# Patient Record
Sex: FEMALE | Race: WHITE | NOT HISPANIC OR LATINO | URBAN - METROPOLITAN AREA
[De-identification: names, ages, dates, MRNs, and addresses within clinical notes are randomized per-mention and may not be internally consistent; named-entity substitution may affect disease eponyms.]

---

## 2023-05-17 ENCOUNTER — OFFICE VISIT (OUTPATIENT)
Facility: LOCATION | Age: 63
End: 2023-05-17
Payer: COMMERCIAL

## 2023-05-17 DIAGNOSIS — H16.223 KERATOCONJUNCTIVITIS SICCA, BILATERAL: ICD-10-CM

## 2023-05-17 DIAGNOSIS — H25.13 AGE-RELATED NUCLEAR CATARACT, BILATERAL: Primary | ICD-10-CM

## 2023-05-17 DIAGNOSIS — H11.442 CONJUNCTIVAL CYST OF LEFT EYE: ICD-10-CM

## 2023-05-17 DIAGNOSIS — H43.822 VITREOMACULAR ADHESION, LEFT EYE: ICD-10-CM

## 2023-05-17 DIAGNOSIS — H52.4 PRESBYOPIA: ICD-10-CM

## 2023-05-17 DIAGNOSIS — D48.1 NEOPLASM OF UNCERTIAN BEHAVIOR OF EYELID: ICD-10-CM

## 2023-05-17 PROCEDURE — 92134 CPTRZ OPH DX IMG PST SGM RTA: CPT

## 2023-05-17 PROCEDURE — 99204 OFFICE O/P NEW MOD 45 MIN: CPT

## 2023-05-17 RX ORDER — LOTEPREDNOL ETABONATE 5 MG/G
0.5 % GEL OPHTHALMIC
Qty: 10 | Refills: 0 | Status: ACTIVE
Start: 2023-05-17

## 2023-05-17 ASSESSMENT — KERATOMETRY
OD: 42.75
OS: 42.88

## 2023-05-17 ASSESSMENT — INTRAOCULAR PRESSURE
OS: 17
OD: 16

## 2023-05-17 ASSESSMENT — VISUAL ACUITY
OD: 20/20
OS: 20/30

## 2023-05-17 NOTE — IMPRESSION/PLAN
Impression: Keratoconjunctivitis sicca, bilateral: V50.004. Plan: Pt educated on the chronic nature of condition. Recommended artificial tears QID to prn OU. Instructed patient to notify clinic if no improvement in symptoms. Monitor annually. Gave sample and Rxed loteprednol for flare ups.

## 2023-05-17 NOTE — IMPRESSION/PLAN
Impression: Conjunctival cyst of left eye: H11.442. Plan: Pt educated on condition. Conjuctival cyst temporal lower lid. Pt noticing discomfort. Will refer pt to oculoplastics for further evaluation and treatment.

## 2023-05-17 NOTE — IMPRESSION/PLAN
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 Plan: Pt educated on findings. OCT was preformed today. Condition is not significantly affecting vision. Pt to notify clinic if noticing a decrease in vision. Monitor annually with DFE with mac OCT.

## 2023-05-17 NOTE — IMPRESSION/PLAN
Impression: Neoplasm of uncertian behavior of eyelid: D48.1. Plan: Non suspicious lesion. Pt educated on condition. No recent change in size or color. Pt educated on surgical options, pt elects for referral at this time. Pt referred to oculoplastics.

## 2023-05-17 NOTE — IMPRESSION/PLAN
Impression: Presbyopia: H52.4. Plan: Recommended getting MRx and wearing glasses for best vision. Made aware of refraction fee. normal S1, S2 heard